# Patient Record
Sex: FEMALE | Race: WHITE | NOT HISPANIC OR LATINO
[De-identification: names, ages, dates, MRNs, and addresses within clinical notes are randomized per-mention and may not be internally consistent; named-entity substitution may affect disease eponyms.]

---

## 2021-06-07 ENCOUNTER — APPOINTMENT (OUTPATIENT)
Dept: FAMILY MEDICINE | Facility: CLINIC | Age: 46
End: 2021-06-07
Payer: COMMERCIAL

## 2021-06-07 VITALS
HEART RATE: 72 BPM | DIASTOLIC BLOOD PRESSURE: 70 MMHG | TEMPERATURE: 97.7 F | HEIGHT: 62 IN | OXYGEN SATURATION: 98 % | BODY MASS INDEX: 24.11 KG/M2 | SYSTOLIC BLOOD PRESSURE: 120 MMHG | WEIGHT: 131 LBS

## 2021-06-07 PROBLEM — Z00.00 ENCOUNTER FOR PREVENTIVE HEALTH EXAMINATION: Status: ACTIVE | Noted: 2021-06-07

## 2021-06-07 PROCEDURE — 99213 OFFICE O/P EST LOW 20 MIN: CPT

## 2021-06-07 PROCEDURE — 99072 ADDL SUPL MATRL&STAF TM PHE: CPT

## 2021-06-07 NOTE — HISTORY OF PRESENT ILLNESS
[FreeTextEntry1] : Medication renewal [de-identified] : 46-year-old female suffers from anxiety depression.  She has been doing well on benzodiazepine and SSRI.  Began new job position and has been doing well.  Single mother of 2 with a special needs child.  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

## 2022-04-11 ENCOUNTER — APPOINTMENT (OUTPATIENT)
Dept: FAMILY MEDICINE | Facility: CLINIC | Age: 47
End: 2022-04-11
Payer: COMMERCIAL

## 2022-04-11 VITALS
WEIGHT: 128 LBS | BODY MASS INDEX: 23.55 KG/M2 | OXYGEN SATURATION: 98 % | SYSTOLIC BLOOD PRESSURE: 112 MMHG | HEART RATE: 110 BPM | HEIGHT: 62 IN | TEMPERATURE: 97.7 F | DIASTOLIC BLOOD PRESSURE: 80 MMHG

## 2022-04-11 PROCEDURE — 99213 OFFICE O/P EST LOW 20 MIN: CPT

## 2022-04-11 PROCEDURE — 99072 ADDL SUPL MATRL&STAF TM PHE: CPT

## 2022-04-11 NOTE — HISTORY OF PRESENT ILLNESS
[FreeTextEntry1] : chronic lower back pain\par  [de-identified] : Patient presents for lower back pain x 11 months that has been increasing in pain over the past few weeks. Patient reports a tingling sensation on her right leg in the AM. But goes away throughout the day. Denies difficulty urinating or having a bowel movement. Patient has not seen an ortho or physical therapy prior to today. Patient also lifts her adult disabled daughter frequently. Had an MRI ordered last year, but never completed the order. Taking motrin and tylenol with no relief. Some relief with baclofen. Taken meloxicam in the past with minimal relief. Unable to take tramadol due to seizures in the past.

## 2022-04-11 NOTE — PHYSICAL EXAM
[Normal] : normal rate, regular rhythm, normal S1 and S2 and no murmur heard [de-identified] : tender to palpate lumbar- right side, no spinal tenderness

## 2022-04-11 NOTE — PLAN
[FreeTextEntry1] : Steroid pack, muscle relaxers and 3 days of pain medicine given. Patient to follow up with orthopedic. Advised to have physical therapy. \par \par Patient educated on resting her back, minimize heavy lifting, take medications as prescribed. Educated on movements for lower back, TENS unit and continuing NSAIDS. \par \par Follow up after ortho.

## 2022-05-11 ENCOUNTER — NON-APPOINTMENT (OUTPATIENT)
Age: 47
End: 2022-05-11

## 2022-07-31 ENCOUNTER — NON-APPOINTMENT (OUTPATIENT)
Age: 47
End: 2022-07-31

## 2022-09-15 ENCOUNTER — APPOINTMENT (OUTPATIENT)
Dept: FAMILY MEDICINE | Facility: CLINIC | Age: 47
End: 2022-09-15

## 2022-09-15 VITALS
TEMPERATURE: 97.2 F | WEIGHT: 136 LBS | SYSTOLIC BLOOD PRESSURE: 120 MMHG | BODY MASS INDEX: 25.03 KG/M2 | OXYGEN SATURATION: 100 % | DIASTOLIC BLOOD PRESSURE: 80 MMHG | RESPIRATION RATE: 15 BRPM | HEIGHT: 62 IN | HEART RATE: 87 BPM

## 2022-09-15 DIAGNOSIS — L30.9 DERMATITIS, UNSPECIFIED: ICD-10-CM

## 2022-09-15 PROCEDURE — 99214 OFFICE O/P EST MOD 30 MIN: CPT

## 2022-09-15 NOTE — PLAN
[FreeTextEntry1] : This very pleasant 47-year-old female presents for an evaluation\par Low back derangement–chronic low back pain which is worsening in intensity and frequency of attack.  Point tenderness and muscle spasticity noted in the paralumbar region\par She complains of radicular pain through the left buttock and down to the knee.  Her pulses and reflexes are strong and normal\par An MRI done recently shows evidence of lumbar disc disease with multifocal spinal stenosis.\par Degenerative joint disease of the of the lumbar facet\par Scoliosis to the thoracolumbar spine\par She has followed with orthopedics who have recommended a series of injections.\par She has had partial relief with oxycodone and baclofen.  She still is in significant pain.  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 0.5 mg twice daily is used for muscle spasticity and anxiety symptoms\par Dermatitis–intermittent recurrences of herpetic-like lesions along dermatomes are evident.  Acyclovir ointment 5 times daily is prescribed\par

## 2022-09-15 NOTE — HISTORY OF PRESENT ILLNESS
[FreeTextEntry1] : back pain  [de-identified] : hx of severe back pain, worsening over time \par lumbar disc with spinal stenosis \par scoliosis in thoraco lumbar spine \par nerve compression \par djd \par left sided pain to knee \par parasthesias \par norco with mild improvement \par ortho suggests epidurals \par

## 2022-09-15 NOTE — HEALTH RISK ASSESSMENT
[0] : 2) Feeling down, depressed, or hopeless: Not at all (0) [PHQ-2 Negative - No further assessment needed] : PHQ-2 Negative - No further assessment needed [[id number]] : 0

## 2022-09-30 ENCOUNTER — APPOINTMENT (OUTPATIENT)
Dept: PAIN MANAGEMENT | Facility: CLINIC | Age: 47
End: 2022-09-30

## 2022-09-30 ENCOUNTER — APPOINTMENT (OUTPATIENT)
Dept: ORTHOPEDIC SURGERY | Facility: CLINIC | Age: 47
End: 2022-09-30

## 2022-09-30 VITALS — HEIGHT: 62 IN | WEIGHT: 136 LBS | BODY MASS INDEX: 25.03 KG/M2

## 2022-09-30 PROCEDURE — 99072 ADDL SUPL MATRL&STAF TM PHE: CPT

## 2022-09-30 PROCEDURE — 99204 OFFICE O/P NEW MOD 45 MIN: CPT

## 2022-09-30 RX ORDER — METHYLPREDNISOLONE 4 MG/1
4 TABLET ORAL
Qty: 1 | Refills: 0 | Status: DISCONTINUED | COMMUNITY
Start: 2022-04-11 | End: 2022-09-30

## 2022-09-30 RX ORDER — METHOCARBAMOL 750 MG/1
750 TABLET, FILM COATED ORAL 4 TIMES DAILY
Qty: 40 | Refills: 0 | Status: DISCONTINUED | COMMUNITY
Start: 2022-04-11 | End: 2022-09-30

## 2022-09-30 RX ORDER — HYDROCODONE BITARTRATE AND IBUPROFEN 5; 200 MG/1; MG/1
5-200 TABLET ORAL
Qty: 60 | Refills: 0 | Status: DISCONTINUED | COMMUNITY
Start: 2022-09-13 | End: 2022-09-30

## 2022-09-30 RX ORDER — HYDROCODONE BITARTRATE AND ACETAMINOPHEN 5; 325 MG/1; MG/1
5-325 TABLET ORAL
Qty: 60 | Refills: 0 | Status: DISCONTINUED | COMMUNITY
Start: 2022-04-11 | End: 2022-09-30

## 2022-09-30 NOTE — ASSESSMENT
[FreeTextEntry1] : This is TC LUCAS,  a 47 year-old female here for lower back pain with impairment in ADLs and functionality.  The pain has not responded to conservative care including NSAID therapy and/or physical therapy.  There is no bleeding tendency, unstable medical condition, or systemic infection.\par \par Based on history and physical, I suspect there are likely multiple pain generators, including bilateral L5-S1 facet arthropathy, central/foraminal stenosis at L5-S1.  \par \par I discussed the above at length with the patient, including prognosis, complications, and red flag symptoms.  We discussed a graded and multidisciplinary treatment plan, including physical therapy/HEP, medications, and/or interventional procedures.  The risks and benefits of each of these were addressed and all questions answered to the patient's apparent satisfaction.  After this discussion, the following plan was developed with the patient: \par \par 1. PT: Emphasized importance of PT/HEP as a mainstay of treatment. [facility] spine conditioning provided as pt unable to afford formal PT. \par 2.  Medication(s): increase NTN, titration provided. Start tizanidine 2mg qhs PRN\par 3.  Imaging/Labs: reviewed as above\par 4.  Procedure(s): Recommend bilateral L3-5 MBB with fluoroscopic guidance. May also benefit from LESI to addresss central stenosis.  \par 5.  Follow-Up: for procedure, then 1 week after. \par \par \par \par The risks, benefits and alternatives of the proposed procedure were explained in detail with the patient. The risks outlined include but are not limited to infection, bleeding, post- dural puncture headache (for VERONICA), nerve injury, a temporary increase in pain, failure to resolve symptoms, allergic reaction, and possible elevation of blood sugar in diabetics if using corticosteroid.  All questions were answered to patient's apparent satisfaction and he/she verbalized an understanding.\par \par Medications have been discussed and reconciled, adverse reactions and side effects have been reviewed with patient.  When appropriate, iSTOP/ has been checked and any aberrations discussed with patient.  \par \par

## 2022-09-30 NOTE — PHYSICAL EXAM
[de-identified] : General:  Awake and alert in moderate distress\par Psych: normal mood and affect\par HEENT: NC/AT, normal visual tracking\par Pulmonary: no increased work of breathing\par CV: extremities are warm and perfused\par Abd: non-distended\par Ext: no c/c/e\par \par Inspection: Non-antalgic gait\par \par Palpation: Tender at lumbar paraspinals bilaterally\par \par ROM: \par Flexion - full, painless\par Extension - markedly limited by pain\par Oblique Extension - markedly limited by pain\par Lateral Flexion - markedly limited by pain\par \par Strength:  HF, KE, KF, DF, PF, EHL all 5/5 \par \par Sensation: Intact at L2-S1 dermatomes bilaterally to light touch \par \par Reflexes: 2+/4 at bilateral Patellar (L2-4) and Achilles (S1).\par Downgoing Babinski bilaterally\par \par Special Tests: \par SLR: (R) - negative; (L) - negative  \par Facet Loading: (R) - positive ; (L) - positive\par JIM:  (R) - negative    ; (L) - negative  \par FADIR:  (R) - negative  ; (L) - negative  \par \par \par

## 2022-09-30 NOTE — HISTORY OF PRESENT ILLNESS
[Lower back] : lower back [Left Leg] : left leg [9] : 9 [Sharp] : sharp [Shooting] : shooting [Stabbing] : stabbing [Throbbing] : throbbing [Constant] : constant [Household chores] : household chores [Sleep] : sleep [Nothing helps with pain getting better] : Nothing helps with pain getting better [Sitting] : sitting [Standing] : standing [Walking] : walking [Bending forward] : bending forward [Lying in bed] : lying in bed [FreeTextEntry1] : 09/30/2022: This is TC LUCAS, a pleasant 47 year-old female presenting to my office for initial evaluation of lower back pain.   Started in May when she was twisting while holding her disabled child.  LBP radiates to left hip, groin, and posterolateral thigh but never past the knee.  Legs sometimes feel "dead" and "numb," she endorses falls.\par \par Patient denies bowel/bladder incontinence, saddle anesthesia, fevers, chills, weight loss. \par \par Pt has tried PT, NTN (9/2022), robaxin, baclofen, TENS unit without significant relief.\par \par Was told by surgeon (Dr. Cotter) that she was a surgical candidate, however she does not feel she can pursue this as she has a disabled child at home.\par --------------------------------------------------------\par All pertinent imaging independently reviewed and interpreted by me.  \par \par Imaging Review:\par \par MRI-THORACIC SPINE NON CONTRAST 6/2022\par FINDINGS:\par ALIGNMENT: Dextroconvex scoliosis\par VERTEBRAE: There is no vertebral body compression fracture or aggressive marrow\par lesion.\par SPINAL CORD: Normal in morphology and signal intensity.\par PREVERTEBRAL SOFT TISSUES: Unremarkable\par LEVELS:\par T3-4: There is small right paracentral disc herniation without central canal\par stenosis.\par T6-7: There is a left paracentral disc herniation without central canal\par stenosis.\par T7-8: There is left paracentral disc herniation resulting in mild left foraminal\par narrowing\par T8-9: There is left paracentral disc herniation without central canal stenosis.\par There is mild left foraminal narrowing\par T10-11: There is a small right paracentral disc herniation without central canal\par stenosis\par \par IMPRESSION:\par Scoliosis. Disc herniations without central canal stenosis. Mild foraminal\par narrowing as above\par \par \par DATE OF EXAM: 06/02/2022\par MRI-LUMBAR SPINE NON CONTRAST\par FINDINGS:\par \par For the purpose of this examination there is a lumbarized S1 vertebral body and\par a rudimentary S1-S2 disc.\par \par Vertebral body heights: Maintained.\par \par Alignment: Normal.\par \par Marrow signal: No acute fracture or marrow replacing lesion is seen. There is\par edema in the left greater than right L5-S1 pedicles and facets compatible\par degenerative facet disease\par \par Spinal canal: The conus terminates at L1 and is unremarkable\par \par Paravertebral soft tissues: Unremarkable.\par \par Discs: There is decreased T2 disc signal at L2-3 and L5-S1\par \par L1-2: There is no significant posterior disc abnormality, spinal canal or\par foraminal stenosis.\par \par L2-3: There is diffuse disc bulging without significant central canal stenosis.\par There is mild right foraminal narrowing\par \par L3-4: There is no significant posterior disc abnormality, spinal canal or\par foraminal stenosis.\par \par L4-5: There is no significant posterior disc abnormality, spinal canal or\par foraminal stenosis.\par \par L5-S1: There is diffuse disc bulging, ligamentum flavum thickening and facet\par arthropathy resulting in mild central canal stenosis moderate foraminal\par narrowing. Superimposed right foraminal synovial cyst compresses the exiting\par right L5 nerve roots\par \par IMPRESSION:\par Degenerative changes most pronounced at L5-S1 with mild central canal stenosis\par and moderate foraminal narrowing with compression of the exiting right L5 nerve\par roots. Edema in the left greater than right L5 and S1 pedicles compatible with\par degenerative facet disease\par  [] : no [FreeTextEntry7] : left leg  [de-identified] : l mri

## 2022-10-20 ENCOUNTER — APPOINTMENT (OUTPATIENT)
Dept: PAIN MANAGEMENT | Facility: CLINIC | Age: 47
End: 2022-10-20

## 2022-10-20 PROCEDURE — 99072 ADDL SUPL MATRL&STAF TM PHE: CPT

## 2022-10-20 PROCEDURE — 64494 INJ PARAVERT F JNT L/S 2 LEV: CPT | Mod: 59,RT

## 2022-10-20 PROCEDURE — 64493 INJ PARAVERT F JNT L/S 1 LEV: CPT | Mod: LT

## 2022-10-20 NOTE — PROCEDURE
[FreeTextEntry3] : Lumbar MBB\par \par Bilateral L3, L4, L5 medial branch block under fluoroscopic guidance\par  \par Preoperative Diagnosis: Lumbar facet arthropathy without myelopathy\par Postoperative Diagnosis: same\par Surgeon: Zelda Osborn DO\par Assistant: None\par Anesthesia: Local\par EBL: minimal\par Findings: none\par Specimen: none\par \par Informed Consent:\par The risks, benefits and alternatives of the procedure were discussed with the patient. The patient was given opportunity to ask questions regarding the procedure, its indications and the associated risks. The risks of the procedure discussed include but are not limited to infection, bleeding, allergic reactions, nerve injuries, worsening pain, inadequate relief, and side effects. \par  \par Technique:\par       The patient was placed in the prone position on the fluoroscopic table with a pillow under the abdomen. Routine monitors were applied. The back was prepped and draped in the usual sterile fashion and sterile technique was adhered to throughout the entire procedure. \par  \par       The right L3, L4, L5 levels were identified under fluoroscopic guidance. The vertebral body end plates were aligned and the junction of the superior articular process and the base of the transverse process was brought into view using an oblique angulation of the C-arm. The skin and subcutaneous tissues were infiltrated with 1% Lidocaine. \par  \par       At all the levels except for the lumbosacral junction, a 22-gauge 3.5in spinal needle was inserted at the angle between the superior articular process and the base of the transverse process (after local anesthesia). Oblique angulation was used to guide the needle into position. The L5 medial branch was identified at the lumbosacral junction and a 22-gauge 3.5in was guided fluoroscopically using AP view to the right lumbosacral junction. 1 cc of 0.25% Bupivacaine was injected at each level.  \par \par The above procedure was then repeated on the left L3, L4, and L5 levels. \par \par  \par The patient tolerated the procedure well. There was no neurological deficits post procedure. The patient went to recovery room in stable condition. Discharge instructions were given to the patient. No focal neuro deficits on exam following 30 minutes of observation, no weakness of the lower extremities. \par \par

## 2022-11-08 ENCOUNTER — APPOINTMENT (OUTPATIENT)
Dept: PAIN MANAGEMENT | Facility: CLINIC | Age: 47
End: 2022-11-08
Payer: COMMERCIAL

## 2022-11-08 NOTE — PHYSICAL EXAM
[de-identified] : General:  Awake and alert in moderate distress\par Psych: normal mood and affect\par HEENT: NC/AT, normal visual tracking\par Pulmonary: no increased work of breathing\par CV: extremities are warm and perfused\par Abd: non-distended\par Ext: no c/c/e\par \par Inspection: Non-antalgic gait\par \par Palpation: Tender at lumbar paraspinals bilaterally\par \par ROM: \par Flexion - full, painless\par Extension - markedly limited by pain\par Oblique Extension - markedly limited by pain\par Lateral Flexion - markedly limited by pain\par \par Strength:  HF, KE, KF, DF, PF, EHL all 5/5 \par \par Sensation: Intact at L2-S1 dermatomes bilaterally to light touch \par \par Reflexes: 2+/4 at bilateral Patellar (L2-4) and Achilles (S1).\par Downgoing Babinski bilaterally\par \par Special Tests: \par SLR: (R) - negative; (L) - negative  \par Facet Loading: (R) - positive ; (L) - positive\par JIM:  (R) - negative    ; (L) - negative  \par FADIR:  (R) - negative  ; (L) - negative  \par \par \par

## 2022-11-08 NOTE — HISTORY OF PRESENT ILLNESS
[Lower back] : lower back [Left Leg] : left leg [9] : 9 [Sharp] : sharp [Shooting] : shooting [Stabbing] : stabbing [Throbbing] : throbbing [Constant] : constant [Household chores] : household chores [Sleep] : sleep [Nothing helps with pain getting better] : Nothing helps with pain getting better [Sitting] : sitting [Standing] : standing [Walking] : walking [Bending forward] : bending forward [Lying in bed] : lying in bed [FreeTextEntry1] : 11/08/2022: Pt returns for follow up.  Recently had Bilateral L3, L4, L5 medial branch block on 10/20/2022.  Patient reports  % pain relief lasting .\par \par \par 09/30/2022: This is TC LUCAS, a pleasant 47 year-old female presenting to my office for initial evaluation of lower back pain.   Started in May when she was twisting while holding her disabled child.  LBP radiates to left hip, groin, and posterolateral thigh but never past the knee.  Legs sometimes feel "dead" and "numb," she endorses falls.\par \par Patient denies bowel/bladder incontinence, saddle anesthesia, fevers, chills, weight loss. \par \par Pt has tried PT, NTN (9/2022), robaxin, baclofen, TENS unit without significant relief.\par \par Was told by surgeon (Dr. Cotter) that she was a surgical candidate, however she does not feel she can pursue this as she has a disabled child at home.\par --------------------------------------------------------\par All pertinent imaging independently reviewed and interpreted by me.  \par \par Imaging Review:\par \par MRI-THORACIC SPINE NON CONTRAST 6/2022\par FINDINGS:\par ALIGNMENT: Dextroconvex scoliosis\par VERTEBRAE: There is no vertebral body compression fracture or aggressive marrow\par lesion.\par SPINAL CORD: Normal in morphology and signal intensity.\par PREVERTEBRAL SOFT TISSUES: Unremarkable\par LEVELS:\par T3-4: There is small right paracentral disc herniation without central canal\par stenosis.\par T6-7: There is a left paracentral disc herniation without central canal\par stenosis.\par T7-8: There is left paracentral disc herniation resulting in mild left foraminal\par narrowing\par T8-9: There is left paracentral disc herniation without central canal stenosis.\par There is mild left foraminal narrowing\par T10-11: There is a small right paracentral disc herniation without central canal\par stenosis\par \par IMPRESSION:\par Scoliosis. Disc herniations without central canal stenosis. Mild foraminal\par narrowing as above\par \par \par DATE OF EXAM: 06/02/2022\par MRI-LUMBAR SPINE NON CONTRAST\par FINDINGS:\par \par For the purpose of this examination there is a lumbarized S1 vertebral body and\par a rudimentary S1-S2 disc.\par \par Vertebral body heights: Maintained.\par \par Alignment: Normal.\par \par Marrow signal: No acute fracture or marrow replacing lesion is seen. There is\par edema in the left greater than right L5-S1 pedicles and facets compatible\par degenerative facet disease\par \par Spinal canal: The conus terminates at L1 and is unremarkable\par \par Paravertebral soft tissues: Unremarkable.\par \par Discs: There is decreased T2 disc signal at L2-3 and L5-S1\par \par L1-2: There is no significant posterior disc abnormality, spinal canal or\par foraminal stenosis.\par \par L2-3: There is diffuse disc bulging without significant central canal stenosis.\par There is mild right foraminal narrowing\par \par L3-4: There is no significant posterior disc abnormality, spinal canal or\par foraminal stenosis.\par \par L4-5: There is no significant posterior disc abnormality, spinal canal or\par foraminal stenosis.\par \par L5-S1: There is diffuse disc bulging, ligamentum flavum thickening and facet\par arthropathy resulting in mild central canal stenosis moderate foraminal\par narrowing. Superimposed right foraminal synovial cyst compresses the exiting\par right L5 nerve roots\par \par IMPRESSION:\par Degenerative changes most pronounced at L5-S1 with mild central canal stenosis\par and moderate foraminal narrowing with compression of the exiting right L5 nerve\par roots. Edema in the left greater than right L5 and S1 pedicles compatible with\par degenerative facet disease\par  [] : no [FreeTextEntry7] : left leg  [de-identified] : l mri

## 2022-11-08 NOTE — ASSESSMENT
[FreeTextEntry1] : This is TC LUCAS,  a 47 year-old female here for lower back pain with impairment in ADLs and functionality.  The pain has not responded to conservative care including NSAID therapy and/or physical therapy.  There is no bleeding tendency, unstable medical condition, or systemic infection.\par \par Based on history and physical, I suspect there are likely multiple pain generators, including bilateral L5-S1 facet arthropathy, central/foraminal stenosis at L5-S1.  \par \par Interventional History:\par 10/20/2022: Bilateral L3, L4, L5 medial branch block \par \par I discussed the above at length with the patient, including prognosis, complications, and red flag symptoms.  We discussed a graded and multidisciplinary treatment plan, including physical therapy/HEP, medications, and/or interventional procedures.  The risks and benefits of each of these were addressed and all questions answered to the patient's apparent satisfaction.  After this discussion, the following plan was developed with the patient: \par \par 1. PT: Emphasized importance of PT/HEP as a mainstay of treatment. [facility] spine conditioning provided as pt unable to afford formal PT. \par 2.  Medication(s): increase NTN, titration provided. Start tizanidine 2mg qhs PRN\par 3.  Imaging/Labs: reviewed as above\par 4.  Procedure(s): Recommend bilateral L3-5 MBB with fluoroscopic guidance. May also benefit from LESI to addresss central stenosis.  \par 5.  Follow-Up: for procedure, then 1 week after. \par \par \par \par The risks, benefits and alternatives of the proposed procedure were explained in detail with the patient. The risks outlined include but are not limited to infection, bleeding, post- dural puncture headache (for VERONICA), nerve injury, a temporary increase in pain, failure to resolve symptoms, allergic reaction, and possible elevation of blood sugar in diabetics if using corticosteroid.  All questions were answered to patient's apparent satisfaction and he/she verbalized an understanding.\par \par Medications have been discussed and reconciled, adverse reactions and side effects have been reviewed with patient.  When appropriate, iSTOP/ has been checked and any aberrations discussed with patient.  \par \par

## 2022-11-08 NOTE — REASON FOR VISIT
[Initial Visit] : an initial pain management visit [FreeTextEntry2] : lower back pain radiating to  left  leg

## 2022-11-10 RX ORDER — GABAPENTIN 300 MG/1
300 CAPSULE ORAL
Qty: 180 | Refills: 3 | Status: DISCONTINUED | COMMUNITY
Start: 2022-09-15 | End: 2022-11-10

## 2022-11-15 ENCOUNTER — APPOINTMENT (OUTPATIENT)
Dept: PAIN MANAGEMENT | Facility: CLINIC | Age: 47
End: 2022-11-15
Payer: COMMERCIAL

## 2022-11-18 ENCOUNTER — APPOINTMENT (OUTPATIENT)
Dept: PAIN MANAGEMENT | Facility: CLINIC | Age: 47
End: 2022-11-18
Payer: COMMERCIAL

## 2022-11-18 VITALS — WEIGHT: 136 LBS | HEIGHT: 62 IN | BODY MASS INDEX: 25.03 KG/M2

## 2022-11-18 PROCEDURE — 99214 OFFICE O/P EST MOD 30 MIN: CPT

## 2022-11-18 PROCEDURE — 99072 ADDL SUPL MATRL&STAF TM PHE: CPT

## 2022-11-18 NOTE — PHYSICAL EXAM
[de-identified] : General:  Awake and alert in moderate distress\par Psych: normal mood and affect\par HEENT: NC/AT, normal visual tracking\par Pulmonary: no increased work of breathing\par CV: extremities are warm and perfused\par Abd: non-distended\par Ext: no c/c/e\par \par Inspection: Non-antalgic gait\par \par Palpation: Tender at lumbar paraspinals bilaterally\par \par ROM: \par Flexion - full, painless\par Extension - markedly limited by pain\par Oblique Extension - markedly limited by pain\par Lateral Flexion - markedly limited by pain\par \par Strength:  HF, KE, KF, DF, PF, EHL all 5/5 \par \par Sensation: Intact at L2-S1 dermatomes bilaterally to light touch \par \par Reflexes: 2+/4 at bilateral Patellar (L2-4) and Achilles (S1).\par Downgoing Babinski bilaterally\par \par Special Tests: \par SLR: (R) - negative; (L) - negative  \par Facet Loading: (R) - positive ; (L) - positive\par JIM:  (R) - negative    ; (L) - negative  \par FADIR:  (R) - negative  ; (L) - negative  \par \par \par

## 2022-11-18 NOTE — ASSESSMENT
[FreeTextEntry1] : This is TC LUCAS,  a 47 year-old female here for lower back pain with impairment in ADLs and functionality.  The pain has not responded to conservative care including NSAID therapy and/or physical therapy.  There is no bleeding tendency, unstable medical condition, or systemic infection.\par \par Based on history and physical, I suspect there are likely multiple pain generators, including bilateral L5-S1 facet arthropathy, central/foraminal stenosis at L5-S1.  \par \par I discussed the above at length with the patient, including prognosis, complications, and red flag symptoms.  We discussed a graded and multidisciplinary treatment plan, including physical therapy/HEP, medications, and/or interventional procedures.  The risks and benefits of each of these were addressed and all questions answered to the patient's apparent satisfaction.  After this discussion, the following plan was developed with the patient: \par \par 1. PT: Emphasized importance of PT/HEP as a mainstay of treatment. [facility] spine conditioning provided as pt unable to afford formal PT. \par 2.  Medication(s): Continue NTN 600mg TID. Start tizanidine 2mg qhs PRN. Refillprovided\par 3.  Imaging/Labs: reviewed as above\par 4.  Procedure(s): Recommend second bilateral L3-5 MBB with fluoroscopic guidance. Will add some steroid to injectate this time.  Consider LESI PRN in the future\par 5.  Follow-Up: for procedure, then 1 week after. \par \par \par \par The risks, benefits and alternatives of the proposed procedure were explained in detail with the patient. The risks outlined include but are not limited to infection, bleeding, post- dural puncture headache (for VERONICA), nerve injury, a temporary increase in pain, failure to resolve symptoms, allergic reaction, and possible elevation of blood sugar in diabetics if using corticosteroid.  All questions were answered to patient's apparent satisfaction and he/she verbalized an understanding.\par \par Medications have been discussed and reconciled, adverse reactions and side effects have been reviewed with patient.  When appropriate, iSTOP/ has been checked and any aberrations discussed with patient.  \par \par

## 2022-11-18 NOTE — PHYSICAL EXAM
[de-identified] : General:  Awake and alert in moderate distress\par Psych: normal mood and affect\par HEENT: NC/AT, normal visual tracking\par Pulmonary: no increased work of breathing\par CV: extremities are warm and perfused\par Abd: non-distended\par Ext: no c/c/e\par \par Inspection: Non-antalgic gait\par \par Palpation: Tender at lumbar paraspinals bilaterally\par \par ROM: \par Flexion - full, painless\par Extension - markedly limited by pain\par Oblique Extension - markedly limited by pain\par Lateral Flexion - markedly limited by pain\par \par Strength:  HF, KE, KF, DF, PF, EHL all 5/5 \par \par Sensation: Intact at L2-S1 dermatomes bilaterally to light touch \par \par Reflexes: 2+/4 at bilateral Patellar (L2-4) and Achilles (S1).\par Downgoing Babinski bilaterally\par \par Special Tests: \par SLR: (R) - negative; (L) - negative  \par Facet Loading: (R) - positive ; (L) - positive\par JIM:  (R) - negative    ; (L) - negative  \par FADIR:  (R) - negative  ; (L) - negative  \par \par \par

## 2022-11-18 NOTE — HISTORY OF PRESENT ILLNESS
[Lower back] : lower back [Left Leg] : left leg [5] : 5 [Sharp] : sharp [Shooting] : shooting [Stabbing] : stabbing [Throbbing] : throbbing [Occasional] : occasional [Household chores] : household chores [Sleep] : sleep [Nothing helps with pain getting better] : Nothing helps with pain getting better [Sitting] : sitting [Standing] : standing [Walking] : walking [Bending forward] : bending forward [Lying in bed] : lying in bed [FreeTextEntry1] : 11/18/2022 B/L L3,L4,L5 MBB on with 80% relief.  Currently taking NTN 600mg TID, tizanidine, motrin with good relief.  Would like to proceed with second MBB. Patient denies new weakness, numbness, tingling, bowel/bladder dysfunction.\par \par 09/30/2022: This is TC LUCAS, a pleasant 47 year-old female presenting to my office for initial evaluation of lower back pain.   Started in May when she was twisting while holding her disabled child.  LBP radiates to left hip, groin, and posterolateral thigh but never past the knee.  Legs sometimes feel "dead" and "numb," she endorses falls.\par \par Patient denies bowel/bladder incontinence, saddle anesthesia, fevers, chills, weight loss. \par \par Pt has tried PT, NTN (9/2022), robaxin, baclofen, TENS unit without significant relief.\par \par Was told by surgeon (Dr. Cotter) that she was a surgical candidate, however she does not feel she can pursue this as she has a disabled child at home.\par --------------------------------------------------------\par All pertinent imaging independently reviewed and interpreted by me.  \par \par Imaging Review:\par \par MRI-THORACIC SPINE NON CONTRAST 6/2022\par FINDINGS:\par ALIGNMENT: Dextroconvex scoliosis\par VERTEBRAE: There is no vertebral body compression fracture or aggressive marrow\par lesion.\par SPINAL CORD: Normal in morphology and signal intensity.\par PREVERTEBRAL SOFT TISSUES: Unremarkable\par LEVELS:\par T3-4: There is small right paracentral disc herniation without central canal\par stenosis.\par T6-7: There is a left paracentral disc herniation without central canal\par stenosis.\par T7-8: There is left paracentral disc herniation resulting in mild left foraminal\par narrowing\par T8-9: There is left paracentral disc herniation without central canal stenosis.\par There is mild left foraminal narrowing\par T10-11: There is a small right paracentral disc herniation without central canal\par stenosis\par \par IMPRESSION:\par Scoliosis. Disc herniations without central canal stenosis. Mild foraminal\par narrowing as above\par \par \par DATE OF EXAM: 06/02/2022\par MRI-LUMBAR SPINE NON CONTRAST\par FINDINGS:\par \par For the purpose of this examination there is a lumbarized S1 vertebral body and\par a rudimentary S1-S2 disc.\par \par Vertebral body heights: Maintained.\par \par Alignment: Normal.\par \par Marrow signal: No acute fracture or marrow replacing lesion is seen. There is\par edema in the left greater than right L5-S1 pedicles and facets compatible\par degenerative facet disease\par \par Spinal canal: The conus terminates at L1 and is unremarkable\par \par Paravertebral soft tissues: Unremarkable.\par \par Discs: There is decreased T2 disc signal at L2-3 and L5-S1\par \par L1-2: There is no significant posterior disc abnormality, spinal canal or\par foraminal stenosis.\par \par L2-3: There is diffuse disc bulging without significant central canal stenosis.\par There is mild right foraminal narrowing\par \par L3-4: There is no significant posterior disc abnormality, spinal canal or\par foraminal stenosis.\par \par L4-5: There is no significant posterior disc abnormality, spinal canal or\par foraminal stenosis.\par \par L5-S1: There is diffuse disc bulging, ligamentum flavum thickening and facet\par arthropathy resulting in mild central canal stenosis moderate foraminal\par narrowing. Superimposed right foraminal synovial cyst compresses the exiting\par right L5 nerve roots\par \par IMPRESSION:\par Degenerative changes most pronounced at L5-S1 with mild central canal stenosis\par and moderate foraminal narrowing with compression of the exiting right L5 nerve\par roots. Edema in the left greater than right L5 and S1 pedicles compatible with\par degenerative facet disease\par  [] : no [FreeTextEntry7] : left leg  [de-identified] : l mri  [TWNoteComboBox1] : 80%

## 2022-11-18 NOTE — HISTORY OF PRESENT ILLNESS
[Lower back] : lower back [Left Leg] : left leg [5] : 5 [Sharp] : sharp [Shooting] : shooting [Stabbing] : stabbing [Throbbing] : throbbing [Occasional] : occasional [Household chores] : household chores [Sleep] : sleep [Nothing helps with pain getting better] : Nothing helps with pain getting better [Sitting] : sitting [Standing] : standing [Walking] : walking [Bending forward] : bending forward [Lying in bed] : lying in bed [FreeTextEntry1] : 11/18/2022 B/L L3,L4,L5 MBB on with 80% relief.  Currently taking NTN 600mg TID, tizanidine, motrin with good relief.  Would like to proceed with second MBB. Patient denies new weakness, numbness, tingling, bowel/bladder dysfunction.\par \par 09/30/2022: This is TC LUCAS, a pleasant 47 year-old female presenting to my office for initial evaluation of lower back pain.   Started in May when she was twisting while holding her disabled child.  LBP radiates to left hip, groin, and posterolateral thigh but never past the knee.  Legs sometimes feel "dead" and "numb," she endorses falls.\par \par Patient denies bowel/bladder incontinence, saddle anesthesia, fevers, chills, weight loss. \par \par Pt has tried PT, NTN (9/2022), robaxin, baclofen, TENS unit without significant relief.\par \par Was told by surgeon (Dr. Cotter) that she was a surgical candidate, however she does not feel she can pursue this as she has a disabled child at home.\par --------------------------------------------------------\par All pertinent imaging independently reviewed and interpreted by me.  \par \par Imaging Review:\par \par MRI-THORACIC SPINE NON CONTRAST 6/2022\par FINDINGS:\par ALIGNMENT: Dextroconvex scoliosis\par VERTEBRAE: There is no vertebral body compression fracture or aggressive marrow\par lesion.\par SPINAL CORD: Normal in morphology and signal intensity.\par PREVERTEBRAL SOFT TISSUES: Unremarkable\par LEVELS:\par T3-4: There is small right paracentral disc herniation without central canal\par stenosis.\par T6-7: There is a left paracentral disc herniation without central canal\par stenosis.\par T7-8: There is left paracentral disc herniation resulting in mild left foraminal\par narrowing\par T8-9: There is left paracentral disc herniation without central canal stenosis.\par There is mild left foraminal narrowing\par T10-11: There is a small right paracentral disc herniation without central canal\par stenosis\par \par IMPRESSION:\par Scoliosis. Disc herniations without central canal stenosis. Mild foraminal\par narrowing as above\par \par \par DATE OF EXAM: 06/02/2022\par MRI-LUMBAR SPINE NON CONTRAST\par FINDINGS:\par \par For the purpose of this examination there is a lumbarized S1 vertebral body and\par a rudimentary S1-S2 disc.\par \par Vertebral body heights: Maintained.\par \par Alignment: Normal.\par \par Marrow signal: No acute fracture or marrow replacing lesion is seen. There is\par edema in the left greater than right L5-S1 pedicles and facets compatible\par degenerative facet disease\par \par Spinal canal: The conus terminates at L1 and is unremarkable\par \par Paravertebral soft tissues: Unremarkable.\par \par Discs: There is decreased T2 disc signal at L2-3 and L5-S1\par \par L1-2: There is no significant posterior disc abnormality, spinal canal or\par foraminal stenosis.\par \par L2-3: There is diffuse disc bulging without significant central canal stenosis.\par There is mild right foraminal narrowing\par \par L3-4: There is no significant posterior disc abnormality, spinal canal or\par foraminal stenosis.\par \par L4-5: There is no significant posterior disc abnormality, spinal canal or\par foraminal stenosis.\par \par L5-S1: There is diffuse disc bulging, ligamentum flavum thickening and facet\par arthropathy resulting in mild central canal stenosis moderate foraminal\par narrowing. Superimposed right foraminal synovial cyst compresses the exiting\par right L5 nerve roots\par \par IMPRESSION:\par Degenerative changes most pronounced at L5-S1 with mild central canal stenosis\par and moderate foraminal narrowing with compression of the exiting right L5 nerve\par roots. Edema in the left greater than right L5 and S1 pedicles compatible with\par degenerative facet disease\par  [] : no [FreeTextEntry7] : left leg  [de-identified] : l mri  [TWNoteComboBox1] : 80%

## 2022-12-12 ENCOUNTER — APPOINTMENT (OUTPATIENT)
Dept: PAIN MANAGEMENT | Facility: CLINIC | Age: 47
End: 2022-12-12
Payer: COMMERCIAL

## 2022-12-12 DIAGNOSIS — M47.816 SPONDYLOSIS W/OUT MYELOPATHY OR RADICULOPATHY, LUMBAR REGION: ICD-10-CM

## 2022-12-12 DIAGNOSIS — M54.50 LOW BACK PAIN, UNSPECIFIED: ICD-10-CM

## 2022-12-12 PROCEDURE — 64493 INJ PARAVERT F JNT L/S 1 LEV: CPT

## 2022-12-12 PROCEDURE — J3490M: CUSTOM

## 2022-12-12 PROCEDURE — 99072 ADDL SUPL MATRL&STAF TM PHE: CPT

## 2022-12-12 PROCEDURE — 64494 INJ PARAVERT F JNT L/S 2 LEV: CPT | Mod: RT,59

## 2022-12-12 NOTE — PROCEDURE
[FreeTextEntry3] : Lumbar MBB\par \par Bilateral L3, L4, L5 medial branch block under fluoroscopic guidance\par  \par Preoperative Diagnosis: Lumbar facet arthropathy\par Postoperative Diagnosis: same\par Surgeon: Zelda Osborn DO\par Assistant: None\par Anesthesia: Local\par EBL: minimal\par Findings: none\par Specimen: none\par \par Informed Consent:\par The risks, benefits and alternatives of the procedure were discussed with the patient. The patient was given opportunity to ask questions regarding the procedure, its indications and the associated risks. The risks of the procedure discussed include but are not limited to infection, bleeding, allergic reactions, nerve injuries, worsening pain, inadequate relief, and side effects. \par  \par Technique:\par       The patient was placed in the prone position on the fluoroscopic table with a pillow under the abdomen. Routine monitors were applied. The back was prepped and draped in the usual sterile fashion and sterile technique was adhered to throughout the entire procedure. \par  \par       The right L3, L4, L5 levels were identified under fluoroscopic guidance. The vertebral body end plates were aligned and the junction of the superior articular process and the base of the transverse process  was brought into view using an oblique angulation of the C-arm. The skin and subcutaneous tissues were infiltrated with 1% Lidocaine. \par  \par       At all the levels except for the lumbosacral junction, a 25-gauge 3.5in spinal needle was inserted at the angle between the superior articular process and the base of the transverse process (after local anesthesia). Oblique angulation was used to guide the needle into position. The L5 medial branch was identified at the lumbosacral junction and a 25-gauge 3.5in was guided fluoroscopically using AP view to the right lumbosacral junction. 1 cc of a mixture containing 5cc 0.25% ropivacaine and 40mg methylprednisolone was injected at each level.  \par \par The above procedure was then repeated on the left L3, L4, and L5 levels. \par \par \par  \par The patient tolerated the procedure well. There was no neurological deficits post procedure. The patient went to recovery room in stable condition. Discharge instructions were given to the patient. No focal neuro deficits on exam following 30 minutes of observation, no weakness of the lower extremities. \par \par

## 2023-01-04 ENCOUNTER — APPOINTMENT (OUTPATIENT)
Dept: PAIN MANAGEMENT | Facility: CLINIC | Age: 48
End: 2023-01-04

## 2023-01-17 ENCOUNTER — NON-APPOINTMENT (OUTPATIENT)
Age: 48
End: 2023-01-17

## 2023-01-26 ENCOUNTER — RX RENEWAL (OUTPATIENT)
Age: 48
End: 2023-01-26

## 2023-03-27 ENCOUNTER — NON-APPOINTMENT (OUTPATIENT)
Age: 48
End: 2023-03-27

## 2023-05-01 ENCOUNTER — NON-APPOINTMENT (OUTPATIENT)
Age: 48
End: 2023-05-01

## 2023-05-04 ENCOUNTER — NON-APPOINTMENT (OUTPATIENT)
Age: 48
End: 2023-05-04

## 2023-05-04 ENCOUNTER — APPOINTMENT (OUTPATIENT)
Dept: OPHTHALMOLOGY | Facility: CLINIC | Age: 48
End: 2023-05-04

## 2023-06-07 ENCOUNTER — APPOINTMENT (OUTPATIENT)
Dept: OPHTHALMOLOGY | Facility: CLINIC | Age: 48
End: 2023-06-07

## 2023-06-22 ENCOUNTER — APPOINTMENT (OUTPATIENT)
Dept: OPHTHALMOLOGY | Facility: CLINIC | Age: 48
End: 2023-06-22

## 2023-07-17 RX ORDER — ACYCLOVIR 800 MG/1
800 TABLET ORAL
Qty: 25 | Refills: 1 | Status: ACTIVE | COMMUNITY
Start: 2022-02-07 | End: 1900-01-01

## 2023-07-17 RX ORDER — ACYCLOVIR 50 MG/G
5 OINTMENT TOPICAL
Qty: 1 | Refills: 1 | Status: ACTIVE | COMMUNITY
Start: 2022-08-14 | End: 1900-01-01

## 2023-07-20 ENCOUNTER — APPOINTMENT (OUTPATIENT)
Dept: FAMILY MEDICINE | Facility: CLINIC | Age: 48
End: 2023-07-20
Payer: COMMERCIAL

## 2023-07-20 VITALS
WEIGHT: 143 LBS | BODY MASS INDEX: 26.31 KG/M2 | DIASTOLIC BLOOD PRESSURE: 70 MMHG | HEART RATE: 80 BPM | SYSTOLIC BLOOD PRESSURE: 110 MMHG | RESPIRATION RATE: 16 BRPM | TEMPERATURE: 97.3 F | HEIGHT: 62 IN | OXYGEN SATURATION: 96 %

## 2023-07-20 DIAGNOSIS — F41.0 PANIC DISORDER [EPISODIC PAROXYSMAL ANXIETY]: ICD-10-CM

## 2023-07-20 DIAGNOSIS — Z13.228 ENCOUNTER FOR SCREENING FOR OTHER SUSPECTED ENDOCRINE DISORDER: ICD-10-CM

## 2023-07-20 DIAGNOSIS — Z13.21 ENCOUNTER FOR SCREENING FOR OTHER SUSPECTED ENDOCRINE DISORDER: ICD-10-CM

## 2023-07-20 DIAGNOSIS — Z13.0 ENCOUNTER FOR SCREENING FOR OTHER SUSPECTED ENDOCRINE DISORDER: ICD-10-CM

## 2023-07-20 DIAGNOSIS — B00.89 OTHER HERPESVIRAL INFECTION: ICD-10-CM

## 2023-07-20 DIAGNOSIS — Z13.29 ENCOUNTER FOR SCREENING FOR OTHER SUSPECTED ENDOCRINE DISORDER: ICD-10-CM

## 2023-07-20 PROCEDURE — 99214 OFFICE O/P EST MOD 30 MIN: CPT | Mod: 25

## 2023-07-20 PROCEDURE — 36415 COLL VENOUS BLD VENIPUNCTURE: CPT

## 2023-07-20 NOTE — PLAN
[FreeTextEntry1] : 48-year-old female presents for evaluation\par Dermatitis–suspected herpes dermatitis.  Lab work is drawn for evaluation of HSV 1 2\par Rapid strep test and COVID are drawn\par Celiac sprue–positive family history\par Dermatitis suggestive of possible celiac sprue\par IgA IgE levels ordered\par Panic disorder–benzodiazepine is ordered on an as-needed basis\par Lumbar disc disease–chronic low back pain with evidence of herniated disks scoliosis\par Use of Norco on an as-needed basis\par Follows with pain management and orthopedics

## 2023-08-11 ENCOUNTER — NON-APPOINTMENT (OUTPATIENT)
Age: 48
End: 2023-08-11

## 2023-08-11 LAB
ALBUMIN SERPL ELPH-MCNC: 4.8 G/DL
ALP BLD-CCNC: 88 U/L
ALT SERPL-CCNC: 45 U/L
ANION GAP SERPL CALC-SCNC: 11 MMOL/L
AST SERPL-CCNC: 48 U/L
BILIRUB SERPL-MCNC: 0.4 MG/DL
BUN SERPL-MCNC: 8 MG/DL
CALCIUM SERPL-MCNC: 9.1 MG/DL
CHLORIDE SERPL-SCNC: 95 MMOL/L
CHOLEST SERPL-MCNC: 210 MG/DL
CO2 SERPL-SCNC: 27 MMOL/L
CREAT SERPL-MCNC: 0.63 MG/DL
EGFR: 108 ML/MIN/1.73M2
ENDOMYSIUM IGA SER QL: NEGATIVE
ENDOMYSIUM IGA TITR SER: NORMAL
ERYTHROCYTE [SEDIMENTATION RATE] IN BLOOD BY WESTERGREN METHOD: 6 MM/HR
ESTIMATED AVERAGE GLUCOSE: 105 MG/DL
GLIADIN IGA SER QL: <5 UNITS
GLIADIN IGG SER QL: <5 UNITS
GLIADIN PEPTIDE IGA SER-ACNC: NEGATIVE
GLIADIN PEPTIDE IGG SER-ACNC: NEGATIVE
GLUCOSE SERPL-MCNC: 95 MG/DL
HBA1C MFR BLD HPLC: 5.3 %
HDLC SERPL-MCNC: 77 MG/DL
HSV 1+2 IGG SER IA-IMP: NEGATIVE
HSV 1+2 IGG SER IA-IMP: NEGATIVE
HSV1 IGG SER QL: <0.01 INDEX
HSV1-DNA: NOT DETECTED
HSV2 IGG SER QL: 0.1 INDEX
HSV2-DNA: NOT DETECTED
IGA SER QL IEP: 111 MG/DL
IGE RECEPTOR AB INTERPRETATION: NORMAL
IGE RECEPTOR AB: 2 %
LDLC SERPL CALC-MCNC: 118 MG/DL
NONHDLC SERPL-MCNC: 134 MG/DL
POTASSIUM SERPL-SCNC: 4.7 MMOL/L
PROT SERPL-MCNC: 6.7 G/DL
SARS-COV-2 N GENE NPH QL NAA+PROBE: NOT DETECTED
SODIUM SERPL-SCNC: 133 MMOL/L
TOTAL IGE SMQN RAST: 44 KU/L
TRIGL SERPL-MCNC: 90 MG/DL
TSH SERPL-ACNC: 1.65 UIU/ML

## 2023-09-26 RX ORDER — HYDROCODONE BITARTRATE AND ACETAMINOPHEN 7.5; 3 MG/1; MG/1
7.5-3 TABLET ORAL 4 TIMES DAILY
Qty: 120 | Refills: 0 | Status: DISCONTINUED | COMMUNITY
Start: 2022-10-14 | End: 2023-09-26

## 2023-11-25 RX ORDER — BACLOFEN 20 MG/1
20 TABLET ORAL 3 TIMES DAILY
Qty: 90 | Refills: 0 | Status: DISCONTINUED | COMMUNITY
Start: 1900-01-01 | End: 2023-11-25

## 2023-11-25 RX ORDER — CLONAZEPAM 0.5 MG/1
0.5 TABLET ORAL
Qty: 60 | Refills: 0 | Status: DISCONTINUED | COMMUNITY
Start: 1900-01-01 | End: 2023-11-25

## 2023-12-08 RX ORDER — VALACYCLOVIR 1 G/1
1 TABLET, FILM COATED ORAL
Qty: 20 | Refills: 0 | Status: ACTIVE | COMMUNITY
Start: 2023-12-08 | End: 1900-01-01

## 2023-12-13 ENCOUNTER — APPOINTMENT (OUTPATIENT)
Dept: FAMILY MEDICINE | Facility: CLINIC | Age: 48
End: 2023-12-13
Payer: COMMERCIAL

## 2023-12-13 VITALS
HEART RATE: 76 BPM | SYSTOLIC BLOOD PRESSURE: 130 MMHG | TEMPERATURE: 97.3 F | OXYGEN SATURATION: 97 % | WEIGHT: 143 LBS | HEIGHT: 62 IN | DIASTOLIC BLOOD PRESSURE: 80 MMHG | RESPIRATION RATE: 16 BRPM | BODY MASS INDEX: 26.31 KG/M2

## 2023-12-13 DIAGNOSIS — M54.30 SCIATICA, UNSPECIFIED SIDE: ICD-10-CM

## 2023-12-13 DIAGNOSIS — U07.1 COVID-19: ICD-10-CM

## 2023-12-13 PROCEDURE — 99214 OFFICE O/P EST MOD 30 MIN: CPT

## 2023-12-13 NOTE — HISTORY OF PRESENT ILLNESS
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

## 2023-12-13 NOTE — HEALTH RISK ASSESSMENT
[0] : 2) Feeling down, depressed, or hopeless: Not at all (0) [PHQ-2 Negative - No further assessment needed] : PHQ-2 Negative - No further assessment needed [[id number]] : 0

## 2023-12-15 LAB
INFLUENZA A RESULT: NOT DETECTED
INFLUENZA B RESULT: NOT DETECTED
RESP SYN VIRUS RESULT: NOT DETECTED
SARS-COV-2 RESULT: DETECTED

## 2023-12-27 ENCOUNTER — NON-APPOINTMENT (OUTPATIENT)
Age: 48
End: 2023-12-27

## 2024-01-04 RX ORDER — GABAPENTIN 600 MG/1
600 TABLET, COATED ORAL 4 TIMES DAILY
Qty: 120 | Refills: 0 | Status: DISCONTINUED | COMMUNITY
Start: 2022-11-10 | End: 2024-01-04

## 2024-01-04 RX ORDER — TIZANIDINE 4 MG/1
4 TABLET ORAL
Qty: 90 | Refills: 0 | Status: DISCONTINUED | COMMUNITY
Start: 2023-04-03 | End: 2024-01-04

## 2024-01-05 RX ORDER — HYDROCODONE BITARTRATE AND ACETAMINOPHEN 7.5; 325 MG/1; MG/1
7.5-325 TABLET ORAL EVERY 6 HOURS
Qty: 120 | Refills: 0 | Status: DISCONTINUED | COMMUNITY
Start: 2023-09-26 | End: 2024-01-05

## 2024-01-21 RX ORDER — OXYCODONE AND ACETAMINOPHEN 5; 325 MG/1; MG/1
5-325 TABLET ORAL 4 TIMES DAILY
Qty: 120 | Refills: 0 | Status: DISCONTINUED | COMMUNITY
Start: 2024-01-05 | End: 2024-01-21

## 2024-01-21 RX ORDER — AMOXICILLIN AND CLAVULANATE POTASSIUM 875; 125 MG/1; MG/1
875-125 TABLET, COATED ORAL
Qty: 20 | Refills: 0 | Status: DISCONTINUED | COMMUNITY
Start: 2023-12-23 | End: 2024-01-21

## 2024-01-21 RX ORDER — DOXYCYCLINE HYCLATE 100 MG/1
100 CAPSULE ORAL
Qty: 20 | Refills: 0 | Status: DISCONTINUED | COMMUNITY
Start: 2022-07-08 | End: 2024-01-21

## 2024-01-21 RX ORDER — NIRMATRELVIR AND RITONAVIR 300-100 MG
20 X 150 MG & KIT ORAL
Qty: 30 | Refills: 0 | Status: DISCONTINUED | COMMUNITY
Start: 2023-12-13 | End: 2024-01-21

## 2024-01-21 RX ORDER — CLINDAMYCIN HYDROCHLORIDE 300 MG/1
300 CAPSULE ORAL EVERY 8 HOURS
Qty: 30 | Refills: 0 | Status: DISCONTINUED | COMMUNITY
Start: 2021-07-26 | End: 2024-01-21

## 2024-01-21 RX ORDER — HYDROCODONE BITARTRATE AND ACETAMINOPHEN 5; 325 MG/1; MG/1
5-325 TABLET ORAL
Qty: 120 | Refills: 0 | Status: DISCONTINUED | COMMUNITY
Start: 2024-01-05 | End: 2024-01-21

## 2024-01-21 RX ORDER — AZITHROMYCIN 250 MG/1
250 TABLET, FILM COATED ORAL
Qty: 1 | Refills: 0 | Status: DISCONTINUED | COMMUNITY
Start: 2022-03-02 | End: 2024-01-21

## 2024-02-05 RX ORDER — METHOCARBAMOL 750 MG/1
750 TABLET, FILM COATED ORAL 3 TIMES DAILY
Qty: 60 | Refills: 0 | Status: ACTIVE | COMMUNITY
Start: 2024-01-04 | End: 1900-01-01

## 2024-02-20 RX ORDER — OXYCODONE AND ACETAMINOPHEN 7.5; 325 MG/1; MG/1
7.5-325 TABLET ORAL
Qty: 60 | Refills: 0 | Status: ACTIVE | COMMUNITY
Start: 2024-01-21 | End: 1900-01-01

## 2024-03-15 ENCOUNTER — APPOINTMENT (OUTPATIENT)
Dept: FAMILY MEDICINE | Facility: CLINIC | Age: 49
End: 2024-03-15
Payer: COMMERCIAL

## 2024-03-15 DIAGNOSIS — M54.16 RADICULOPATHY, LUMBAR REGION: ICD-10-CM

## 2024-03-15 DIAGNOSIS — F32.9 MAJOR DEPRESSIVE DISORDER, SINGLE EPISODE, UNSPECIFIED: ICD-10-CM

## 2024-03-15 DIAGNOSIS — F43.22 ADJUSTMENT DISORDER WITH ANXIETY: ICD-10-CM

## 2024-03-15 PROCEDURE — 99214 OFFICE O/P EST MOD 30 MIN: CPT

## 2024-03-15 NOTE — PHYSICAL EXAM
[No Acute Distress] : no acute distress [Well Nourished] : well nourished [Well-Appearing] : well-appearing [Well Developed] : well developed [Normal Sclera/Conjunctiva] : normal sclera/conjunctiva [PERRL] : pupils equal round and reactive to light [EOMI] : extraocular movements intact [Normal Outer Ear/Nose] : the outer ears and nose were normal in appearance [Normal Oropharynx] : the oropharynx was normal [No JVD] : no jugular venous distention [No Lymphadenopathy] : no lymphadenopathy [Thyroid Normal, No Nodules] : the thyroid was normal and there were no nodules present [Supple] : supple [No Accessory Muscle Use] : no accessory muscle use [No Respiratory Distress] : no respiratory distress  [Clear to Auscultation] : lungs were clear to auscultation bilaterally [Normal Rate] : normal rate  [Normal S1, S2] : normal S1 and S2 [Regular Rhythm] : with a regular rhythm [No Carotid Bruits] : no carotid bruits [No Murmur] : no murmur heard [No Abdominal Bruit] : a ~M bruit was not heard ~T in the abdomen [No Varicosities] : no varicosities [Pedal Pulses Present] : the pedal pulses are present [No Palpable Aorta] : no palpable aorta [No Edema] : there was no peripheral edema [No Extremity Clubbing/Cyanosis] : no extremity clubbing/cyanosis [Soft] : abdomen soft [Non Tender] : non-tender [Non-distended] : non-distended [No Masses] : no abdominal mass palpated [No HSM] : no HSM [Normal Bowel Sounds] : normal bowel sounds [Normal Anterior Cervical Nodes] : no anterior cervical lymphadenopathy [Normal Posterior Cervical Nodes] : no posterior cervical lymphadenopathy [No Spinal Tenderness] : no spinal tenderness [No CVA Tenderness] : no CVA  tenderness [Grossly Normal Strength/Tone] : grossly normal strength/tone [No Joint Swelling] : no joint swelling [Coordination Grossly Intact] : coordination grossly intact [No Rash] : no rash [No Focal Deficits] : no focal deficits [Normal Gait] : normal gait [Deep Tendon Reflexes (DTR)] : deep tendon reflexes were 2+ and symmetric [Normal Insight/Judgement] : insight and judgment were intact [Normal Affect] : the affect was normal

## 2024-03-19 NOTE — HEALTH RISK ASSESSMENT
[PHQ-2 Negative - No further assessment needed] : PHQ-2 Negative - No further assessment needed [0] : 2) Feeling down, depressed, or hopeless: Not at all (0) [[id number]] : 0

## 2024-03-19 NOTE — PLAN
[FreeTextEntry1] : This is a 48-year-old female who presents for medication reconciliation Lumbar disc disease with stenosis-severe lower back pain follows with neurosurgery.  Complicated by arthritis radiculopathy and scoliosis Controlled with hydrocodone 7.5-325 every 6 hours as needed for pain Spasticity is treated with tizanidine 4 mg, and radiculopathy with gabapentin Adjustment disorder with anxiety-issues with her special needs daughter and the requirements of work

## 2024-03-19 NOTE — HISTORY OF PRESENT ILLNESS
[Home] : at home, [unfilled] , at the time of the visit. [Medical Office: ([facility])___] : at the medical office located in  [Verbal consent obtained from patient] : the patient, [unfilled] [FreeTextEntry1] : Medication reconciliation [de-identified] : Lumbar disc disease with opioid control muscle spasticity with antispasmodic and neuropathy treated with gabapentin

## 2024-03-21 RX ORDER — TIZANIDINE HYDROCHLORIDE 4 MG/1
4 CAPSULE ORAL
Qty: 90 | Refills: 0 | Status: ACTIVE | COMMUNITY
Start: 2022-09-30 | End: 1900-01-01

## 2024-03-21 RX ORDER — GABAPENTIN 400 MG/1
400 CAPSULE ORAL 4 TIMES DAILY
Qty: 120 | Refills: 0 | Status: ACTIVE | COMMUNITY
Start: 2024-01-04 | End: 1900-01-01

## 2024-04-12 RX ORDER — TIZANIDINE 2 MG/1
2 TABLET ORAL EVERY 8 HOURS
Qty: 90 | Refills: 0 | Status: ACTIVE | COMMUNITY
Start: 2024-03-04 | End: 1900-01-01

## 2024-04-15 ENCOUNTER — APPOINTMENT (OUTPATIENT)
Dept: FAMILY MEDICINE | Facility: CLINIC | Age: 49
End: 2024-04-15
Payer: COMMERCIAL

## 2024-04-15 DIAGNOSIS — M62.838 OTHER MUSCLE SPASM: ICD-10-CM

## 2024-04-15 DIAGNOSIS — M48.061 SPINAL STENOSIS, LUMBAR REGION WITHOUT NEUROGENIC CLAUDICATION: ICD-10-CM

## 2024-04-15 DIAGNOSIS — M54.6 PAIN IN THORACIC SPINE: ICD-10-CM

## 2024-04-15 PROCEDURE — 99214 OFFICE O/P EST MOD 30 MIN: CPT

## 2024-04-15 RX ORDER — HYDROCODONE BITARTRATE AND ACETAMINOPHEN 7.5; 325 MG/1; MG/1
7.5-325 TABLET ORAL
Qty: 90 | Refills: 0 | Status: ACTIVE | COMMUNITY
Start: 2024-03-04 | End: 1900-01-01

## 2024-04-15 RX ORDER — GABAPENTIN 600 MG/1
600 TABLET, COATED ORAL TWICE DAILY
Qty: 60 | Refills: 0 | Status: ACTIVE | COMMUNITY
Start: 2024-02-20 | End: 1900-01-01

## 2024-04-15 NOTE — HEALTH RISK ASSESSMENT
[0] : 2) Feeling down, depressed, or hopeless: Not at all (0) [PHQ-2 Negative - No further assessment needed] : PHQ-2 Negative - No further assessment needed [[id number]] : 0

## 2024-04-15 NOTE — HISTORY OF PRESENT ILLNESS
[FreeTextEntry1] : Telehealth [de-identified] : 48-year-old female with chronic lumbar disc problems presents for medication reconciliation

## 2024-04-15 NOTE — PLAN
[FreeTextEntry1] : 48-year-old female presents for evaluation Seen on telehealth visit Lumbar disc disease-history of chronic lumbar disc disease with scoliosis muscle spasticity and neuropathy.  Tizanidine for spasticity relief 2 mg as needed Gabapentin is weaned to 1200 mg daily in divided dosages, and hydrocodone acetaminophen to 3 tabs daily that medication is reviewed and renewed she follows with neurosurgery and pain management she has had a trigger point injection in the past with a modicum of relief.  She feels that she is heading toward the road of surgery

## 2024-04-15 NOTE — PHYSICAL EXAM
Quality 431: Preventive Care And Screening: Unhealthy Alcohol Use - Screening: Patient screened for unhealthy alcohol use using a single question and scores less than 2 times per year Quality 226: Preventive Care And Screening: Tobacco Use: Screening And Cessation Intervention: Patient screened for tobacco use and is an ex/non-smoker [No Acute Distress] : no acute distress Detail Level: Detailed [Well Nourished] : well nourished Quality 130: Documentation Of Current Medications In The Medical Record: Current Medications Documented [Well Developed] : well developed [Well-Appearing] : well-appearing [Normal Sclera/Conjunctiva] : normal sclera/conjunctiva [PERRL] : pupils equal round and reactive to light [EOMI] : extraocular movements intact [Normal Outer Ear/Nose] : the outer ears and nose were normal in appearance [Normal Oropharynx] : the oropharynx was normal [No JVD] : no jugular venous distention [No Lymphadenopathy] : no lymphadenopathy [Supple] : supple [Thyroid Normal, No Nodules] : the thyroid was normal and there were no nodules present [No Respiratory Distress] : no respiratory distress  [No Accessory Muscle Use] : no accessory muscle use [Clear to Auscultation] : lungs were clear to auscultation bilaterally [Normal Rate] : normal rate  [Regular Rhythm] : with a regular rhythm [Normal S1, S2] : normal S1 and S2 [No Murmur] : no murmur heard [No Carotid Bruits] : no carotid bruits [No Abdominal Bruit] : a ~M bruit was not heard ~T in the abdomen [No Varicosities] : no varicosities [Pedal Pulses Present] : the pedal pulses are present [No Edema] : there was no peripheral edema [No Palpable Aorta] : no palpable aorta [No Extremity Clubbing/Cyanosis] : no extremity clubbing/cyanosis [Soft] : abdomen soft [Non Tender] : non-tender [Non-distended] : non-distended [No Masses] : no abdominal mass palpated [No HSM] : no HSM [Normal Bowel Sounds] : normal bowel sounds [Normal Posterior Cervical Nodes] : no posterior cervical lymphadenopathy [Normal Anterior Cervical Nodes] : no anterior cervical lymphadenopathy [No CVA Tenderness] : no CVA  tenderness [No Spinal Tenderness] : no spinal tenderness [No Joint Swelling] : no joint swelling [Grossly Normal Strength/Tone] : grossly normal strength/tone [No Rash] : no rash [Coordination Grossly Intact] : coordination grossly intact [No Focal Deficits] : no focal deficits [Normal Gait] : normal gait [Deep Tendon Reflexes (DTR)] : deep tendon reflexes were 2+ and symmetric [Normal Affect] : the affect was normal [Normal Insight/Judgement] : insight and judgment were intact

## 2024-05-20 RX ORDER — IVERMECTIN 3 MG/1
3 TABLET ORAL ONCE
Qty: 5 | Refills: 0 | Status: ACTIVE | COMMUNITY
Start: 2024-05-20 | End: 1900-01-01

## 2024-06-03 RX ORDER — IVERMECTIN 3 MG/1
3 TABLET ORAL
Qty: 8 | Refills: 0 | Status: ACTIVE | COMMUNITY
Start: 2024-05-18 | End: 1900-01-01

## 2024-06-18 RX ORDER — PERMETHRIN 50 MG/G
5 CREAM TOPICAL
Qty: 1 | Refills: 0 | Status: ACTIVE | COMMUNITY
Start: 2024-05-17 | End: 1900-01-01

## 2024-07-09 DIAGNOSIS — Z00.00 ENCOUNTER FOR GENERAL ADULT MEDICAL EXAMINATION W/OUT ABNORMAL FINDINGS: ICD-10-CM

## 2024-08-14 RX ORDER — NYSTATIN 100000 [USP'U]/ML
100000 SUSPENSION ORAL 3 TIMES DAILY
Qty: 100 | Refills: 0 | Status: ACTIVE | COMMUNITY
Start: 2024-08-14 | End: 1900-01-01

## 2024-09-28 RX ORDER — CYCLOBENZAPRINE HYDROCHLORIDE 10 MG/1
10 TABLET, FILM COATED ORAL 3 TIMES DAILY
Qty: 30 | Refills: 0 | Status: ACTIVE | COMMUNITY
Start: 2024-09-28 | End: 1900-01-01

## 2024-12-05 ENCOUNTER — NON-APPOINTMENT (OUTPATIENT)
Age: 49
End: 2024-12-05

## 2024-12-26 ENCOUNTER — NON-APPOINTMENT (OUTPATIENT)
Age: 49
End: 2024-12-26

## 2024-12-27 RX ORDER — CLONIDINE HYDROCHLORIDE 0.1 MG/1
0.1 TABLET ORAL
Qty: 30 | Refills: 0 | Status: ACTIVE | COMMUNITY
Start: 2024-12-27 | End: 1900-01-01

## 2024-12-27 RX ORDER — GABAPENTIN 800 MG/1
800 TABLET, COATED ORAL TWICE DAILY
Qty: 60 | Refills: 0 | Status: ACTIVE | COMMUNITY
Start: 2024-12-27 | End: 1900-01-01

## 2024-12-27 RX ORDER — TIZANIDINE 4 MG/1
4 TABLET ORAL
Qty: 90 | Refills: 0 | Status: ACTIVE | COMMUNITY
Start: 2024-12-27 | End: 1900-01-01

## 2024-12-31 ENCOUNTER — NON-APPOINTMENT (OUTPATIENT)
Age: 49
End: 2024-12-31

## 2025-01-06 ENCOUNTER — NON-APPOINTMENT (OUTPATIENT)
Age: 50
End: 2025-01-06

## 2025-01-07 ENCOUNTER — NON-APPOINTMENT (OUTPATIENT)
Age: 50
End: 2025-01-07

## 2025-01-13 ENCOUNTER — APPOINTMENT (OUTPATIENT)
Dept: NEUROSURGERY | Facility: CLINIC | Age: 50
End: 2025-01-13
Payer: COMMERCIAL

## 2025-01-13 VITALS
BODY MASS INDEX: 24.29 KG/M2 | WEIGHT: 132 LBS | HEIGHT: 62 IN | DIASTOLIC BLOOD PRESSURE: 74 MMHG | SYSTOLIC BLOOD PRESSURE: 130 MMHG

## 2025-01-13 DIAGNOSIS — M48.07 SPINAL STENOSIS, LUMBOSACRAL REGION: ICD-10-CM

## 2025-01-13 DIAGNOSIS — M48.061 SPINAL STENOSIS, LUMBAR REGION WITHOUT NEUROGENIC CLAUDICATION: ICD-10-CM

## 2025-01-13 DIAGNOSIS — M54.50 LOW BACK PAIN, UNSPECIFIED: ICD-10-CM

## 2025-01-13 DIAGNOSIS — M54.16 RADICULOPATHY, LUMBAR REGION: ICD-10-CM

## 2025-01-13 DIAGNOSIS — M43.10 SPONDYLOLISTHESIS, SITE UNSPECIFIED: ICD-10-CM

## 2025-01-13 PROCEDURE — 99204 OFFICE O/P NEW MOD 45 MIN: CPT

## 2025-02-26 ENCOUNTER — APPOINTMENT (OUTPATIENT)
Dept: NEUROSURGERY | Facility: CLINIC | Age: 50
End: 2025-02-26
Payer: COMMERCIAL

## 2025-02-26 DIAGNOSIS — M54.50 LOW BACK PAIN, UNSPECIFIED: ICD-10-CM

## 2025-02-26 DIAGNOSIS — M48.061 SPINAL STENOSIS, LUMBAR REGION WITHOUT NEUROGENIC CLAUDICATION: ICD-10-CM

## 2025-02-26 PROCEDURE — 99212 OFFICE O/P EST SF 10 MIN: CPT

## 2025-02-26 RX ORDER — DIAZEPAM 5 MG/1
5 TABLET ORAL
Qty: 20 | Refills: 0 | Status: ACTIVE | COMMUNITY
Start: 2025-02-26 | End: 1900-01-01

## 2025-02-28 ENCOUNTER — APPOINTMENT (OUTPATIENT)
Dept: FAMILY MEDICINE | Facility: CLINIC | Age: 50
End: 2025-02-28
Payer: COMMERCIAL

## 2025-02-28 VITALS
HEART RATE: 98 BPM | HEIGHT: 62 IN | WEIGHT: 132 LBS | TEMPERATURE: 98.1 F | RESPIRATION RATE: 98 BRPM | OXYGEN SATURATION: 95 % | DIASTOLIC BLOOD PRESSURE: 80 MMHG | BODY MASS INDEX: 24.29 KG/M2 | SYSTOLIC BLOOD PRESSURE: 120 MMHG

## 2025-02-28 DIAGNOSIS — M54.16 RADICULOPATHY, LUMBAR REGION: ICD-10-CM

## 2025-02-28 DIAGNOSIS — M48.061 SPINAL STENOSIS, LUMBAR REGION WITHOUT NEUROGENIC CLAUDICATION: ICD-10-CM

## 2025-02-28 DIAGNOSIS — F43.22 ADJUSTMENT DISORDER WITH ANXIETY: ICD-10-CM

## 2025-02-28 DIAGNOSIS — M47.816 SPONDYLOSIS W/OUT MYELOPATHY OR RADICULOPATHY, LUMBAR REGION: ICD-10-CM

## 2025-02-28 DIAGNOSIS — M43.10 SPONDYLOLISTHESIS, SITE UNSPECIFIED: ICD-10-CM

## 2025-02-28 PROCEDURE — 99214 OFFICE O/P EST MOD 30 MIN: CPT

## 2025-03-05 ENCOUNTER — APPOINTMENT (OUTPATIENT)
Dept: NEUROSURGERY | Facility: HOSPITAL | Age: 50
End: 2025-03-05

## 2025-03-07 PROBLEM — Z98.1 S/P LUMBAR FUSION: Status: ACTIVE | Noted: 2025-03-07

## 2025-03-07 RX ORDER — GABAPENTIN 600 MG/1
600 TABLET, COATED ORAL 4 TIMES DAILY
Qty: 120 | Refills: 0 | Status: ACTIVE | COMMUNITY
Start: 2025-03-07 | End: 1900-01-01

## 2025-03-07 RX ORDER — TIZANIDINE HYDROCHLORIDE 4 MG/1
4 CAPSULE ORAL EVERY 8 HOURS
Qty: 21 | Refills: 0 | Status: ACTIVE | COMMUNITY
Start: 2025-03-07 | End: 1900-01-01

## 2025-03-07 RX ORDER — OXYCODONE AND ACETAMINOPHEN 5; 325 MG/1; MG/1
5-325 TABLET ORAL EVERY 4 HOURS
Qty: 56 | Refills: 0 | Status: ACTIVE | COMMUNITY
Start: 2025-03-07 | End: 1900-01-01

## 2025-03-13 ENCOUNTER — APPOINTMENT (OUTPATIENT)
Dept: NEUROSURGERY | Facility: CLINIC | Age: 50
End: 2025-03-13
Payer: COMMERCIAL

## 2025-03-13 VITALS
SYSTOLIC BLOOD PRESSURE: 126 MMHG | OXYGEN SATURATION: 95 % | TEMPERATURE: 98.1 F | HEART RATE: 94 BPM | DIASTOLIC BLOOD PRESSURE: 84 MMHG

## 2025-03-13 DIAGNOSIS — Z98.1 ARTHRODESIS STATUS: ICD-10-CM

## 2025-03-13 PROCEDURE — 99024 POSTOP FOLLOW-UP VISIT: CPT

## 2025-03-13 RX ORDER — OXYCODONE AND ACETAMINOPHEN 5; 325 MG/1; MG/1
5-325 TABLET ORAL EVERY 4 HOURS
Qty: 56 | Refills: 0 | Status: ACTIVE | COMMUNITY
Start: 2025-03-13 | End: 1900-01-01

## 2025-03-14 RX ORDER — TIZANIDINE HYDROCHLORIDE 4 MG/1
4 CAPSULE ORAL EVERY 8 HOURS
Qty: 21 | Refills: 0 | Status: ACTIVE | COMMUNITY
Start: 2025-03-14 | End: 1900-01-01

## 2025-03-21 RX ORDER — OXYCODONE AND ACETAMINOPHEN 5; 325 MG/1; MG/1
5-325 TABLET ORAL
Qty: 56 | Refills: 0 | Status: ACTIVE | COMMUNITY
Start: 2025-03-21 | End: 1900-01-01

## 2025-03-21 RX ORDER — TIZANIDINE HYDROCHLORIDE 4 MG/1
4 CAPSULE ORAL AT BEDTIME
Qty: 14 | Refills: 0 | Status: ACTIVE | COMMUNITY
Start: 2025-03-21 | End: 1900-01-01

## 2025-03-21 RX ORDER — METHOCARBAMOL 750 MG/1
750 TABLET, FILM COATED ORAL 3 TIMES DAILY
Qty: 42 | Refills: 0 | Status: ACTIVE | COMMUNITY
Start: 2025-03-13 | End: 1900-01-01

## 2025-03-21 RX ORDER — TIZANIDINE HYDROCHLORIDE 4 MG/1
4 CAPSULE ORAL EVERY 8 HOURS
Qty: 21 | Refills: 0 | Status: ACTIVE | COMMUNITY
Start: 2025-03-21 | End: 1900-01-01

## 2025-03-26 RX ORDER — OXYCODONE AND ACETAMINOPHEN 5; 325 MG/1; MG/1
5-325 TABLET ORAL
Qty: 56 | Refills: 0 | Status: ACTIVE | COMMUNITY
Start: 2025-03-26 | End: 1900-01-01

## 2025-03-31 ENCOUNTER — NON-APPOINTMENT (OUTPATIENT)
Age: 50
End: 2025-03-31

## 2025-04-10 ENCOUNTER — APPOINTMENT (OUTPATIENT)
Dept: NEUROSURGERY | Facility: CLINIC | Age: 50
End: 2025-04-10
Payer: COMMERCIAL

## 2025-04-10 DIAGNOSIS — Z98.1 ARTHRODESIS STATUS: ICD-10-CM

## 2025-04-10 PROCEDURE — 99024 POSTOP FOLLOW-UP VISIT: CPT

## 2025-05-01 RX ORDER — GABAPENTIN 600 MG/1
600 TABLET, COATED ORAL 4 TIMES DAILY
Qty: 120 | Refills: 0 | Status: ACTIVE | COMMUNITY
Start: 2025-05-01 | End: 1900-01-01

## 2025-05-05 ENCOUNTER — APPOINTMENT (OUTPATIENT)
Dept: NEUROSURGERY | Facility: CLINIC | Age: 50
End: 2025-05-05
Payer: COMMERCIAL

## 2025-05-05 VITALS — WEIGHT: 133 LBS | BODY MASS INDEX: 24.48 KG/M2 | HEIGHT: 62 IN

## 2025-05-05 DIAGNOSIS — Z98.1 ARTHRODESIS STATUS: ICD-10-CM

## 2025-05-05 PROCEDURE — 99024 POSTOP FOLLOW-UP VISIT: CPT

## 2025-05-05 RX ORDER — METHOCARBAMOL 750 MG/1
750 TABLET, FILM COATED ORAL
Qty: 14 | Refills: 0 | Status: ACTIVE | COMMUNITY
Start: 2025-05-05 | End: 1900-01-01

## 2025-05-21 ENCOUNTER — NON-APPOINTMENT (OUTPATIENT)
Age: 50
End: 2025-05-21

## 2025-06-21 RX ORDER — GABAPENTIN 100 MG/1
100 CAPSULE ORAL
Qty: 90 | Refills: 0 | Status: ACTIVE | COMMUNITY
Start: 2025-06-21 | End: 1900-01-01

## 2025-06-23 RX ORDER — DOXYCYCLINE 25 MG/5ML
25 FOR SUSPENSION ORAL
Qty: 200 | Refills: 0 | Status: ACTIVE | COMMUNITY
Start: 2025-06-23 | End: 1900-01-01

## 2025-07-07 ENCOUNTER — APPOINTMENT (OUTPATIENT)
Dept: NEUROSURGERY | Facility: CLINIC | Age: 50
End: 2025-07-07
Payer: COMMERCIAL

## 2025-07-07 VITALS — BODY MASS INDEX: 25.76 KG/M2 | HEIGHT: 62 IN | WEIGHT: 140 LBS

## 2025-07-07 PROCEDURE — 99213 OFFICE O/P EST LOW 20 MIN: CPT

## 2025-08-12 ENCOUNTER — RX RENEWAL (OUTPATIENT)
Age: 50
End: 2025-08-12

## 2025-08-28 RX ORDER — DOXYCYCLINE HYCLATE 100 MG/1
100 TABLET, COATED ORAL TWICE DAILY
Qty: 20 | Refills: 0 | Status: ACTIVE | COMMUNITY
Start: 2025-08-28 | End: 1900-01-01

## 2025-09-10 ENCOUNTER — APPOINTMENT (OUTPATIENT)
Dept: NEUROSURGERY | Facility: CLINIC | Age: 50
End: 2025-09-10
Payer: COMMERCIAL

## 2025-09-10 DIAGNOSIS — Z98.1 ARTHRODESIS STATUS: ICD-10-CM

## 2025-09-10 PROCEDURE — 99213 OFFICE O/P EST LOW 20 MIN: CPT
